# Patient Record
Sex: FEMALE | NOT HISPANIC OR LATINO | URBAN - METROPOLITAN AREA
[De-identification: names, ages, dates, MRNs, and addresses within clinical notes are randomized per-mention and may not be internally consistent; named-entity substitution may affect disease eponyms.]

---

## 2022-01-07 ENCOUNTER — IMPORTED ENCOUNTER (OUTPATIENT)
Dept: URBAN - METROPOLITAN AREA CLINIC 1 | Facility: CLINIC | Age: 57
End: 2022-01-07

## 2022-01-07 PROBLEM — H52.4: Noted: 2022-01-07

## 2022-01-07 PROBLEM — H52.11: Noted: 2022-01-07

## 2022-01-07 PROBLEM — H44.22: Noted: 2022-01-07

## 2022-01-07 PROBLEM — H52.223: Noted: 2022-01-07

## 2022-01-07 PROCEDURE — S0620 ROUTINE OPHTHALMOLOGICAL EXA: HCPCS

## 2022-01-07 NOTE — PATIENT DISCUSSION
1. Myopia w/ Astigmatism OU -- (High Myope OS) Rx was given for correction if indicated and requested. 2. Presbyopia 3. Cataract OU -- Observe. 4. Pinguecula OU -- Recommend wearing sunglasses when exposed to UV light. CTL trials given to patient (DT1 MF). Return for an appointment in 1-2 week cc with Dr. Antonio Sewell.

## 2022-01-14 ENCOUNTER — IMPORTED ENCOUNTER (OUTPATIENT)
Dept: URBAN - METROPOLITAN AREA CLINIC 1 | Facility: CLINIC | Age: 57
End: 2022-01-14

## 2022-01-14 NOTE — PATIENT DISCUSSION
Changes have been made to [facility] at today's visit and pt to return in 1-2 weeks for a contact check

## 2022-01-14 NOTE — PATIENT DISCUSSION
1. CC Today -- Pt wanted to try [facility] (Trials given -- Infuse Dailies OD distance OS near.) pt not happy with comfort of Dailies Total One MF. Return for an appointment in 1 week cc with Dr. Rossi Garcia.

## 2022-01-24 ENCOUNTER — IMPORTED ENCOUNTER (OUTPATIENT)
Dept: URBAN - METROPOLITAN AREA CLINIC 1 | Facility: CLINIC | Age: 57
End: 2022-01-24

## 2022-01-24 NOTE — PATIENT DISCUSSION
1.  CC today - PT did not tolerate monovision CTL well. She would like to keep her MF CTL RX. Finalized CTL RX. Return for an appointment in 1 year for 40/cc with Dr. Santiago Alcazar.

## 2022-04-08 ASSESSMENT — VISUAL ACUITY
OD_SC: 20/25
OD_CC: J3
OD_CC: J3
OD_SC: 20/20
OS_CC: J7
OS_SC: 20/30
OD_SC: 20/20-1
OS_SC: 20/25+2
OD_CC: J2
OS_SC: 20/30+1
OS_CC: J2
OS_CC: J3

## 2022-04-08 ASSESSMENT — TONOMETRY
OD_IOP_MMHG: 12
OS_IOP_MMHG: 12